# Patient Record
Sex: MALE | Race: WHITE | NOT HISPANIC OR LATINO | URBAN - METROPOLITAN AREA
[De-identification: names, ages, dates, MRNs, and addresses within clinical notes are randomized per-mention and may not be internally consistent; named-entity substitution may affect disease eponyms.]

---

## 2024-10-18 ENCOUNTER — OFFICE VISIT (OUTPATIENT)
Dept: URBAN - METROPOLITAN AREA CLINIC 109 | Facility: CLINIC | Age: 64
End: 2024-10-18
Payer: MEDICARE

## 2024-10-18 ENCOUNTER — DASHBOARD ENCOUNTERS (OUTPATIENT)
Age: 64
End: 2024-10-18

## 2024-10-18 VITALS
HEIGHT: 72 IN | WEIGHT: 194.2 LBS | BODY MASS INDEX: 26.3 KG/M2 | HEART RATE: 44 BPM | SYSTOLIC BLOOD PRESSURE: 124 MMHG | DIASTOLIC BLOOD PRESSURE: 76 MMHG | TEMPERATURE: 97.9 F

## 2024-10-18 DIAGNOSIS — R19.5 DARK STOOLS: ICD-10-CM

## 2024-10-18 DIAGNOSIS — R13.19 ESOPHAGEAL DYSPHAGIA: ICD-10-CM

## 2024-10-18 DIAGNOSIS — Z12.11 COLON CANCER SCREENING: ICD-10-CM

## 2024-10-18 PROCEDURE — 99204 OFFICE O/P NEW MOD 45 MIN: CPT

## 2024-10-18 RX ORDER — HYDROCHLOROTHIAZIDE 25 MG/1
1 TABLET IN THE MORNING TABLET ORAL ONCE A DAY
Refills: 0 | Status: ACTIVE | COMMUNITY
Start: 1900-01-01

## 2024-10-18 RX ORDER — LEVOTHYROXINE SODIUM 137 UG/1
1 CAPSULE IN THE MORNING ON AN EMPTY STOMACH CAPSULE ORAL ONCE A DAY
Refills: 0 | Status: ACTIVE | COMMUNITY
Start: 1900-01-01

## 2024-10-18 RX ORDER — ASPIRIN 81 MG/1
1 TABLET TABLET, COATED ORAL ONCE A DAY
Status: ACTIVE | COMMUNITY

## 2024-10-18 RX ORDER — AMLODIPINE BESYLATE AND BENAZEPRIL HYDROCHLORIDE 10; 40 MG/1; MG/1
AS DIRECTED CAPSULE ORAL
Status: ACTIVE | COMMUNITY

## 2024-10-18 RX ORDER — CLOPIDOGREL BISULFATE 75 MG/1
1 TABLET TABLET ORAL ONCE A DAY
Status: ACTIVE | COMMUNITY

## 2024-10-18 RX ORDER — METOPROLOL SUCCINATE 50 MG/1
1 TABLET TABLET, FILM COATED, EXTENDED RELEASE ORAL ONCE A DAY
Status: ACTIVE | COMMUNITY

## 2024-10-18 RX ORDER — AMLODIPINE BESYLATE AND BENAZEPRIL HYDROCHLORIDE 10; 20 MG/1; MG/1
CAPSULE ORAL
Qty: 0 | Refills: 0 | Status: ACTIVE | COMMUNITY
Start: 1900-01-01

## 2024-10-18 RX ORDER — ATORVASTATIN CALCIUM 80 MG/1
1 TABLET TABLET, FILM COATED ORAL ONCE A DAY
Status: ACTIVE | COMMUNITY

## 2024-10-18 RX ORDER — CHOLECALCIFEROL (VITAMIN D3) 50 MCG
1 CAPSULE CAPSULE ORAL ONCE A DAY
Refills: 0 | Status: ACTIVE | COMMUNITY
Start: 1900-01-01

## 2024-10-18 RX ORDER — FAMOTIDINE 40 MG/1
1 TABLET TABLET, FILM COATED ORAL ONCE A DAY
Refills: 0 | Status: ACTIVE | COMMUNITY
Start: 1900-01-01

## 2024-10-18 NOTE — HPI-TODAY'S VISIT:
Patient is a 64-year-old male referred for evaluation of black stools. A copy of this OV will be sent to the provider. Sxs for 2-3 days last month. Resolved. No recent change in meds & denies taking peptobismol or iron. Chronic nsaids w/ aspirin 81mg and diclofenac. Has been on Plavix for 6+ months (switched off brillinta d/t cost) for CAD s/p PCI Nov 2023. Denies prior GIB.  No change in BMs. Mixed formed/loose stools x yrs w/ known IBS. No abd pain, N/V, or unintentional wt.loss. Rare reflux well-controlled on H2RA. Intermittent dysphagia to meats/steak once q couple months or less since EGD w/ dilation 2019.  Not bothersome to pt. No worsening or progressive sxs. Denies liquid dysphagia, odynophagia, or early satiety. Denies FHX of GI cancer.  W/u with PCP 10/2024: FOBT (-).  Hgb/Hct 12.4/38.3. Normal MCV/MCHC and TIBC. Iron sat 15% EGD 8/2019 (Dr. 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 No specimens collected. Colon 11/2019 (Dr. Rodriguez): Normal except sigmoid tics, no specimens.

## 2024-10-18 NOTE — PHYSICAL EXAM CONSTITUTIONAL:
Well developed, well nourished, in no acute distress, ambulating without difficulty, normal communication ability [employer]